# Patient Record
Sex: FEMALE | Race: ASIAN | NOT HISPANIC OR LATINO | URBAN - METROPOLITAN AREA
[De-identification: names, ages, dates, MRNs, and addresses within clinical notes are randomized per-mention and may not be internally consistent; named-entity substitution may affect disease eponyms.]

---

## 2018-01-14 ENCOUNTER — EMERGENCY (EMERGENCY)
Facility: HOSPITAL | Age: 23
LOS: 1 days | Discharge: HOME | End: 2018-01-14
Admitting: EMERGENCY MEDICINE
Payer: COMMERCIAL

## 2018-01-14 VITALS
HEART RATE: 65 BPM | RESPIRATION RATE: 15 BRPM | WEIGHT: 138.01 LBS | SYSTOLIC BLOOD PRESSURE: 122 MMHG | DIASTOLIC BLOOD PRESSURE: 77 MMHG | HEIGHT: 65 IN | OXYGEN SATURATION: 98 % | TEMPERATURE: 98 F
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
## 2018-01-14 DIAGNOSIS — S02.32XA FRACTURE OF ORBITAL FLOOR, LEFT SIDE, INITIAL ENCOUNTER FOR CLOSED FRACTURE: ICD-10-CM
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
## 2018-01-14 DIAGNOSIS — R51 HEADACHE: ICD-10-CM

## 2018-01-14 DIAGNOSIS — S05.42XA PENETRATING WOUND OF ORBIT WITH OR WITHOUT FOREIGN BODY, LEFT EYE, INITIAL ENCOUNTER: ICD-10-CM
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
## 2018-01-14 PROCEDURE — 12013 RPR F/E/E/N/L/M 2.6-5.0 CM: CPT

## 2018-01-14 PROCEDURE — 99285 EMERGENCY DEPT VISIT HI MDM: CPT | Mod: 25

## 2018-01-14 PROCEDURE — 99284 EMERGENCY DEPT VISIT MOD MDM: CPT

## 2018-01-14 RX ORDER — BACITRACIN ZINC 500 UNIT/G
1 OINTMENT IN PACKET (EA) TOPICAL ONCE
Qty: 0 | Refills: 0 | Status: COMPLETED | OUTPATIENT
Start: 2018-01-14 | End: 2018-01-14

## 2018-01-14 RX ORDER — OXYCODONE AND ACETAMINOPHEN 5; 325 MG/1; MG/1
1 TABLET ORAL ONCE
Qty: 0 | Refills: 0 | Status: DISCONTINUED | OUTPATIENT
Start: 2018-01-14 | End: 2018-01-14

## 2018-01-14 RX ADMIN — Medication 1 APPLICATION(S): at 19:48

## 2018-01-14 RX ADMIN — OXYCODONE AND ACETAMINOPHEN 1 TABLET(S): 5; 325 TABLET ORAL at 19:47

## 2018-01-14 NOTE — ED PROVIDER NOTE - CARE PLAN
Principal Discharge DX:	Laceration of face Principal Discharge DX:	Laceration of face  Secondary Diagnosis:	Orbital floor fracture

## 2018-01-14 NOTE — ED PROVIDER NOTE - ENMT, MLM
Airway patent, Nasal mucosa clear. Mouth with normal mucosa. Throat has no vesicles, no oropharyngeal exudates and uvula is midline. Ears: no hemotympanum b/l. + L lower orbit swelling, + ecchymosis, + tend, + c shaped superficial 3 cm lac to lower orbit, no active bleeding Airway patent, Nasal mucosa clear. Mouth with normal mucosa. Throat has no vesicles, no oropharyngeal exudates and uvula is midline. Ears: no hemotympanum b/l. + L lower orbit swelling, + ecchymosis, + tend, no paresthesias, + c shaped jagged 3 cm lac to lower orbit, no active bleeding

## 2018-01-14 NOTE — ED PROVIDER NOTE - OBJECTIVE STATEMENT
The pt is a 23 y/o F, who presents to ED w/L facial lac - hit on a surfboard this am. Seen at another hosp, had td updated, had ct of face which showed orbital fl fx. Pt c/o facial pain - 6/10.  here to be sutured by plastics.  Denies loc, visual changes, hearing loss, neck pain, any other c/o

## 2018-01-14 NOTE — ED PROVIDER NOTE - MEDICAL DECISION MAKING DETAILS
pt w/L orbital floor fx - has report from the other hosp but not the actual ct - got hit in face w/surfboard this am, here to be sutured by dr woods - who will also follow / tx her facial fx. has abx from the other hosp, td updated at the other hosp, will add pain meds, understands the importance of icing and f/u

## 2018-01-14 NOTE — ED ADULT NURSE NOTE - OBJECTIVE STATEMENT
[place]fing [place] ocean today fell and hit left under eye on surfboard, 5 cm laceration. Last tetanus this morning.

## 2019-11-02 NOTE — ED PROVIDER NOTE - NEUROLOGICAL, MLM
Alert and oriented, no focal deficits, no motor or sensory deficits. normal gait, + tendon walking, romberg neg
[id number]

## 2024-12-04 NOTE — ED ADULT TRIAGE NOTE - AS O2 DELIVERY
PRINCIPAL DISCHARGE DIAGNOSIS  Diagnosis: Osteoarthritis of right hip  Assessment and Plan of Treatment:      room air